# Patient Record
(demographics unavailable — no encounter records)

---

## 2024-12-23 NOTE — ASSESSMENT
[FreeTextEntry1] : 93 yrs old F with pmx of HTN, HLD, osteoporosis, chronic L Knee pain comes in for follow up.  1. lightheadedness/ dizziness:  Pt had an episode of lightheadedness on 12/18/2024, she went to the ER, and was asked to see ENT. Pt says that she usually feels lightheadedness when getting up from sitting or lying position, feels weak and about to pass out, denies any prior chest pain, sob, cough, fevers, palpitations, ear discharge, tinnitus, ear pressure, vertigo. Recent 2-3 falls but no head injury.  Never lost consciousness.     No other complains.   orthostatic vitals: lying: /76  HR 70/min  standing: /85  HR 87  orthostatic positive:  EKG: First degree AV block  Plan: advised to get up slowly from sitting or lying position hydrate everyday cardiology referral.

## 2024-12-23 NOTE — HISTORY OF PRESENT ILLNESS
[FreeTextEntry1] : lightheadedness.  [de-identified] : 93 yrs old F with pmx of HTN, HLD, osteoporosis, chronic L Knee pain comes in for follow up. Pt had an episode of lightheadedness on 12/18/2024, she went to the ER, and was asked to see ENT. Pt says that she usually feels lightheadedness when getting up from sitting or lying position, feels weak and about to pass out, denies any prior chest pain, sob, cough, fevers, palpitations, ear discharge, tinnitus, ear pressure, vertigo. Recent 2-3 falls but no head injury.  Never lost consciousness.     No other complains.